# Patient Record
Sex: FEMALE | Race: OTHER
[De-identification: names, ages, dates, MRNs, and addresses within clinical notes are randomized per-mention and may not be internally consistent; named-entity substitution may affect disease eponyms.]

---

## 2020-12-15 ENCOUNTER — HOSPITAL ENCOUNTER (OUTPATIENT)
Dept: HOSPITAL 72 - SUR | Age: 45
Discharge: HOME | End: 2020-12-15
Payer: COMMERCIAL

## 2020-12-15 VITALS — SYSTOLIC BLOOD PRESSURE: 96 MMHG | DIASTOLIC BLOOD PRESSURE: 66 MMHG

## 2020-12-15 VITALS — DIASTOLIC BLOOD PRESSURE: 73 MMHG | SYSTOLIC BLOOD PRESSURE: 112 MMHG

## 2020-12-15 VITALS — DIASTOLIC BLOOD PRESSURE: 81 MMHG | SYSTOLIC BLOOD PRESSURE: 115 MMHG

## 2020-12-15 VITALS — HEIGHT: 63 IN | BODY MASS INDEX: 38.98 KG/M2 | WEIGHT: 220 LBS

## 2020-12-15 VITALS — SYSTOLIC BLOOD PRESSURE: 113 MMHG | DIASTOLIC BLOOD PRESSURE: 79 MMHG

## 2020-12-15 VITALS — SYSTOLIC BLOOD PRESSURE: 113 MMHG | DIASTOLIC BLOOD PRESSURE: 83 MMHG

## 2020-12-15 VITALS — DIASTOLIC BLOOD PRESSURE: 74 MMHG | SYSTOLIC BLOOD PRESSURE: 116 MMHG

## 2020-12-15 VITALS — DIASTOLIC BLOOD PRESSURE: 77 MMHG | SYSTOLIC BLOOD PRESSURE: 113 MMHG

## 2020-12-15 VITALS — SYSTOLIC BLOOD PRESSURE: 124 MMHG | DIASTOLIC BLOOD PRESSURE: 88 MMHG

## 2020-12-15 VITALS — DIASTOLIC BLOOD PRESSURE: 86 MMHG | SYSTOLIC BLOOD PRESSURE: 119 MMHG

## 2020-12-15 VITALS — SYSTOLIC BLOOD PRESSURE: 113 MMHG | DIASTOLIC BLOOD PRESSURE: 73 MMHG

## 2020-12-15 DIAGNOSIS — M77.11: Primary | ICD-10-CM

## 2020-12-15 DIAGNOSIS — I10: ICD-10-CM

## 2020-12-15 DIAGNOSIS — F41.9: ICD-10-CM

## 2020-12-15 DIAGNOSIS — F32.9: ICD-10-CM

## 2020-12-15 DIAGNOSIS — E66.01: ICD-10-CM

## 2020-12-15 PROCEDURE — 94003 VENT MGMT INPAT SUBQ DAY: CPT

## 2020-12-15 PROCEDURE — 24359 REPAIR ELBOW DEB/ATTCH OPEN: CPT

## 2020-12-15 PROCEDURE — 94150 VITAL CAPACITY TEST: CPT

## 2020-12-15 NOTE — BRIEF OPERATIVE NOTE
Immediate Post Operative Note


Operative Note


Chief Complaint:  right elbow pain


Pre-op Diagnosis:


right elbow lateral epicondylitis


Procedure:


right elbow lateral epicondylar release


Post-op Diagnosis:  same as pre-op


Findings:  consistent w/pre-op dx studies


Surgeon:  md lev


Assistant:  rip briscoe


Anesthesiologist:  md jem


Anesthesia:  general


Specimen:  none


Complications:  none


Condition:  stable


Fluids:  ns


Estimated Blood Loss:  minimal


Drains:  none


Implant(s) used?:  No











Latonya Briscoe        Dec 15, 2020 13:04

## 2020-12-15 NOTE — 48 HOUR POST ANESTHESIA EVAL
Post Anesthesia Evaluation


Procedure:  R Elbow Epicondylar Release


Date of Evaluation:  Dec 15, 2020


Time of Evaluation:  15:43


Blood Pressure Systolic:  127


0:  84


Pulse Rate:  77


Respiratory Rate:  18


Temperature (Fahrenheit):  98.4


O2 Sat by Pulse Oximetry:  99


Airway:  patent


Nausea:  No


Vomiting:  No


Pain Intensity:  2


Hydration Status:  adequate


Cardiopulmonary Status:


Stable


Mental Status/LOC:  patient returned to baseline


Follow-up Care/Observations:


0


Post-Anesthesia Complications:


0


Follow-up care needed:  ready to discharge











Betito Low MD                Dec 15, 2020 12:34

## 2020-12-15 NOTE — PRE-PROCEDURE NOTE/ATTESTATION
Pre-Procedure Note/Attestation


Complete Prior to Procedure


Planned Procedure:  right


Procedure Narrative:


right elbow lateral epicondylar release





Indications for Procedure


Pre-Operative Diagnosis:


right elbow lateral epicondylitis





Attestation


I attest that I discussed the nature of the procedure; its benefits; risks and 

complications; and alternatives (and the risks and benefits of such al

ternatives), prior to the procedure, with the patient (or the patient's legal 

representative).





I attest that, if there was a reasonable possibility of needing a blood 

transfusion, the patient (or the patient's legal representative) was given the 

[facility] standardized written summary, pursuant 

to the [place] Blood Safety Act ([place] Health and Safety Code # 1645, as 

amended).





I attest that I re-evaluated the patient just prior to the surgery and that 

there has been no change in the patient's H&P, except as documented below:none











Bakari Salmeron MD            Dec 15, 2020 07:10

## 2020-12-15 NOTE — ANETHESIA PREOPERATIVE EVAL
Anesthesia Pre-op PMH/ROS


General


Date of Evaluation:  Dec 15, 2020


Time of Evaluation:  11:59


Anesthesiologist:  Devante


ASA Score:  ASA 3


Mallampati Score


Class I : Soft palate, uvula, fauces, pillars visible


Class II: Soft palate, uvula, fauces visible


Class III: Soft palate, base of uvula visible


Class IV: Only hard plate visible


Mallampati Classification:  Class III


Surgeon:  Jaron


Diagnosis:  R Elbow Pain


Surgical Procedure:  R Elbow Epicondylar Release


Anesthesia History:  none


Family History:  no anesthesia problems


Allergies:  


Coded Allergies:  


     No Known Allergies (Unverified , 20)


Medications:  see eMAR


Patient NPO?:  Yes





Past Medical History


Cardiovascular:  Reports: HTN


Neurologic/Psychiatric:  Reports: depression/anxiety


Other:  obesity - Morbid BMI 40


PSxH Narrative:


[facility]





Anesthesia Pre-op Phys. Exam


Physician Exam





Last Vital Signs








  Date Time  Temp Pulse Resp B/P (MAP) Pulse Ox O2 Delivery O2 Flow Rate FiO2


 


12/15/20 10:52      Room Air  


 


12/15/20 09:56 97.2 69 16 119/86 98   








Constitutional:  NAD


Neurologic:  CN 2-12 intact


Cardiovascular:  RRR


Respiratory:  CTA


Gastrointestinal:  S/NT/ND





Airway Exam


Mallampati Score:  Class III


MO:  full


ROM:  limited


Teeth:  missing, intact





Anesthesia Pre-op A/P


Risk Assessment & Plan


Assessment:


ASA 3


Plan:


TIVA


Status Change Before Surgery:  No





Pre-Antibiotics


Dru Gram Ancef IV


Given Within 1 Hr of Incision:  Yes


Time Given:  12:16











Betito Low MD                Dec 15, 2020 12:10

## 2020-12-15 NOTE — IMMEDIATE POST-OP EVALUATION
Immediate Post-Op Evalulation


Immediate Post-Op Evalulation


Procedure:  R Elbow Epicondylar Release


Date of Evaluation:  Dec 15, 2020


Time of Evaluation:  13:25


IV Fluids:  500 LR


Blood Products:  0


Estimated Blood Loss:  10


Urinary Output:  0


Blood Pressure Systolic:  96


Blood Pressure Diastolic:  66


Pulse Rate:  81


Respiratory Rate:  16


O2 Sat by Pulse Oximetry:  98


Temperature (Fahrenheit):  98.2


Pain Score (1-10):  2


Nausea:  No


Vomiting:  No


Complications


0


Patient Status:  awake, reacts, patent, none


Hydration Status:  adequate


Dru Gram Ancef IV


Given Within 1 Hr of Incision:  Yes


Time Given:  12:16











Betito Low MD                Dec 15, 2020 12:33

## 2020-12-15 NOTE — OPERATIVE NOTE - DICTATED
DATE OF OPERATION:  12/15/2020

PREOPERATIVE DIAGNOSIS:  Right elbow lateral epicondylitis,

chronic.



POSTOPERATIVE DIAGNOSIS:  Right elbow lateral epicondylitis,

chronic.



PROCEDURE:

1. Right elbow lateral epicondyle release and debridement of the origin

of the ECRB.

2. Right elbow, repair of the common extensor muscle mass and tendon.



SURGEON:  Bakari Salmeron MD.



ASSISTANT:  Latonya Yanez PA-C.



ANESTHESIOLOGIST:  Betito Low MD.



ANESTHESIA:  General LMA anesthesia combined with local block with

Marcaine with epinephrine.



ESTIMATED BLOOD LOSS:  Less than 10 mL.



TOURNIQUET TIME:  20 minutes.



COMPLICATIONS:  None.



BRIEF HISTORY:  The patient is a pleasant 45-year-old female, who has had

ongoing right elbow pain, decreased range of motion, stiffness.  She did

not respond to nonoperative treatment including physical therapy,

injections.  After full discussion of risks and benefits of surgery and

complications associated with it including infection, bleeding,

neurovascular complication, possibility of continued pain, possibility of

need for further surgery, possibility of continuation of pain radiating

down the wrist, decreased strength, decreased range of motion, other

complications that may arise, she opted for surgical treatment as

described above.



OPERATIVE PROCEDURE:  The patient was brought to the operating room table

and was placed supine.  All pressure points well padded.  General LMA

anesthesia was induced and the right elbow was prepped and draped in usual

sterile fashion.  A time-out was performed and preop antibiotics were

given.  The right arm was exsanguinated.  Tourniquet was inflated to 275

mmHg.  A standard incision was made distal to the lateral epicondyle in

line with the extensor tendon.  The incision was taken through

subcutaneous tissue.  The extensor tendon was opened in line with its

fibers and it was retracted back.  The ECRB was visualized underneath

there and the ECRB fascia was then released without any complication.  The

origin of the ECRB was visualized.  There was some hyper-fibrovascular

proliferation.  This was debrided.  The wounds were thoroughly

irrigated.



At this point, the extensor tendon was repaired using #1 Vicryl suture

running sutures.  Subcutaneous tissue was closed using 2-0 Vicryl suture

and the skin was closed using 3-0 Monocryl suture.  Sterile dressing was

applied.  The patient was taken to recovery room in stable condition.  All

lap counts and instrument counts were correct.









  ______________________________________________

  Bakari Salmeron M.D.





DR:  ANNIE

D:  12/15/2020 12:58

T:  12/15/2020 16:04

JOB#:  [id number]

CC:



MAMADOU